# Patient Record
Sex: FEMALE | Race: WHITE
[De-identification: names, ages, dates, MRNs, and addresses within clinical notes are randomized per-mention and may not be internally consistent; named-entity substitution may affect disease eponyms.]

---

## 2023-01-09 ENCOUNTER — HOSPITAL ENCOUNTER (OUTPATIENT)
Dept: HOSPITAL 47 - RADUSWWP | Age: 28
Discharge: HOME | End: 2023-01-09
Attending: FAMILY MEDICINE
Payer: COMMERCIAL

## 2023-01-09 DIAGNOSIS — N64.4: Primary | ICD-10-CM

## 2023-01-09 NOTE — USB
Reason for Exam: Clinical finding. 

Technique: 

Method: Whole Breast Handheld.  





Findings: 

The whole breast of both breasts, the axilla of both breasts and the retroareolar of both breasts

were scanned.

A complete US of all four quadrants of the breast and retro-areolar region were reviewed.

Technologist marks a 3 mm oval hypoechoic area or lesion at level of the nipple in the left breast

to small to further characterize presumed benign. Remainder of bilateral breast unremarkable. 





Overall Assessment: Negative, BI-RAD 1





Management: 

Screening Mammogram of both breasts at age 41.

Manage patient symptoms clinically.  Results were given to the patient verbally at the time of exam.





Electronically signed and approved by: Torey Mg M.D.

## 2023-01-20 ENCOUNTER — HOSPITAL ENCOUNTER (OUTPATIENT)
Dept: HOSPITAL 47 - WWCWWP | Age: 28
End: 2023-01-20
Attending: SURGERY
Payer: COMMERCIAL

## 2023-01-20 VITALS
RESPIRATION RATE: 16 BRPM | DIASTOLIC BLOOD PRESSURE: 77 MMHG | SYSTOLIC BLOOD PRESSURE: 121 MMHG | TEMPERATURE: 98.1 F | HEART RATE: 89 BPM

## 2023-01-20 DIAGNOSIS — Z80.0: ICD-10-CM

## 2023-01-20 DIAGNOSIS — Z80.3: ICD-10-CM

## 2023-01-20 DIAGNOSIS — Z91.040: ICD-10-CM

## 2023-01-20 DIAGNOSIS — Z80.8: ICD-10-CM

## 2023-01-20 DIAGNOSIS — N64.4: Primary | ICD-10-CM

## 2023-01-20 NOTE — P.GSHP
History of Present Illness


H&P Date: 23


Chief Complaint: breast pain





     Ariana is a 27 year old white female seen in consultation for Dr. Hernandez 

with a complaint of breast pain. She had a whole breast bilateral ultrasound 

done on 23 which was BIRAD 1. She complains of bilateral stabbing breast 

pain.  It is worse with anything pressing against her breast.  It feels like 

many little needle sticking into her breast. This has been occuring for about 1 

year. She is not complaining of any nipple discharge.  It is worse before her 

periods.  She did not have any trauma to her breast.  She has never had any 

surgery on her breast.  She has not had any infection in the breast.


The pain is constant, and radiates from the breast to the nipples.





caffiene: 3 cans of pepsi/day was drinking cherry pepsi much more; cut down 

about 6 months ago


coffee 1 cup/day


nicotine: 1/2 PPD for 10 years


chocolate: occasional


BCP: 3 months ago and this made the pain worse 


DEPO shot 9 months; 





Family History: 


father: colon cancer passed away at 41


paternal grandmother: stomach cancer


paternal great grand father: brain cancer


paternal great aunt: breast cancer


paternal great aunt: ? type of cancer





Hormonal history: 


Menarche:2


 (1 )  breast fed:no, age at first birth: 18


periods irregular








Surgical History:


2016 circlog/removed


D&C








Medical History:


hypothyroid





Social History:


nicotine: 1/2 PPD


alcohol: none


drugs: Marijuana daily





 














 





- Constitutional


Constitutional: Reports sweats, Denies chills, Denies fever





- EENT


Eyes: denies blurred vision, denies pain


Ears: bilateral: decreased hearing, deny: tinnitus


Ears, nose, mouth and throat: Reports headache, Reports sore throat





- Breasts


Breasts: bilateral: as per HPI





- Cardiovascular


Cardiovascular: Denies chest pain, Denies shortness of breath





- Respiratory


Respiratory: Reports cough





- Gastrointestinal


Gastrointestinal: Denies abdominal pain, Denies diarrhea, Denies nausea, Denies 

vomiting





- Genitourinary (Female)


Genitourinary: Denies dysuria, Denies hematuria





- Menstruation


Menstruation: Reports cycle variable





- Musculoskeletal


Musculoskeletal: Reports myalgias





- Integumentary


Integumentary: Denies pruritus, Denies rash





- Neurological


Neurological: Reports weakness, Denies numbness





- Psychiatric


Psychiatric: Reports anxiety, Denies depression





- Endocrine


Endocrine: Reports fatigue, Denies weight change





- Hematologic/Lymphatic


Comment: 





none





- Allergic/Immunologic


Allergic/Immunologic: Reports seasonal allergies





Past Medical History


History of Any Multi-Drug Resistant Organisms: None Reported


Smoking Status: Current every day smoker





Medications and Allergies


                                Home Medications











 Medication  Instructions  Recorded  Confirmed  Type


 


Levothyroxine Sodium 200 mcg PO DAILY 23 History








                                    Allergies











Allergy/AdvReac Type Severity Reaction Status Date / Time


 


latex Allergy  Rash/Hives Unverified 23 11:15














Surgical - Exam


                                   Vital Signs











Temp Pulse Resp BP Pulse Ox


 


 98.1 F   89   16   121/77   98 


 


 23 11:16  23 11:16  23 11:16  23 11:16  23 11:16














BMI: 19.8





- General


moderate distress





- Eyes


normal ocular movement





- Neck


trachea midline





- Respiratory


normal respiratory effort, clear to auscultation





- Cardiovascular


Rhythm: regular


Heart Sounds: normal: S1, S2





- Abdomen


Abdomen: soft, non tender, no guarding, no rigid, no rebound





- Integumentary





normal turgor





- Neurologic


no disoriented, no combative





- Musculoskeletal


normal gait, normal posture





- Psychiatric


oriented to time, oriented to person, oriented to place, speech is normal, m

natalie intact





Breast Exam:


BRA: 36A


inspection: bilateral grade 1 ptosis


Patient:


Right breast: Multiple positional exam tender under the nipple areolar complex 

fibrocystic changes no dominant masses or nodules of concern


Right axilla: No adenopathy of concern


Left breast: Multi-positional exam tender under the nipple areolar complex no 

dominant masses or nodules of concern


Left axilla: No adenopathy of concern





Results





ultrasound results reviewed





Assessment and Plan


Assessment: 





Impression:


Bilateral mastodynia


Hormone dependent at times


Patient does use caffeine and nicotine


There is nothing which would want interventional biopsy at this time.





Plan:


recommend life style modification; stop caffiene and nicotine


primrose oil





CC: Dr. Hernandez